# Patient Record
Sex: FEMALE | Race: WHITE | NOT HISPANIC OR LATINO | Employment: UNEMPLOYED | URBAN - METROPOLITAN AREA
[De-identification: names, ages, dates, MRNs, and addresses within clinical notes are randomized per-mention and may not be internally consistent; named-entity substitution may affect disease eponyms.]

---

## 2017-01-02 ENCOUNTER — HOSPITAL ENCOUNTER (EMERGENCY)
Facility: MEDICAL CENTER | Age: 42
End: 2017-01-02
Attending: EMERGENCY MEDICINE
Payer: MEDICAID

## 2017-01-02 VITALS
WEIGHT: 143.74 LBS | RESPIRATION RATE: 18 BRPM | HEART RATE: 69 BPM | SYSTOLIC BLOOD PRESSURE: 129 MMHG | OXYGEN SATURATION: 95 % | TEMPERATURE: 98.4 F | DIASTOLIC BLOOD PRESSURE: 79 MMHG | HEIGHT: 69 IN | BODY MASS INDEX: 21.29 KG/M2

## 2017-01-02 DIAGNOSIS — R42 DIZZINESS: ICD-10-CM

## 2017-01-02 DIAGNOSIS — T78.40XA ALLERGIC REACTION, INITIAL ENCOUNTER: ICD-10-CM

## 2017-01-02 LAB
ALBUMIN SERPL BCP-MCNC: 4.6 G/DL (ref 3.2–4.9)
ALBUMIN/GLOB SERPL: 1.8 G/DL
ALP SERPL-CCNC: 36 U/L (ref 30–99)
ALT SERPL-CCNC: 14 U/L (ref 2–50)
ANION GAP SERPL CALC-SCNC: 9 MMOL/L (ref 0–11.9)
APPEARANCE UR: CLEAR
AST SERPL-CCNC: 15 U/L (ref 12–45)
BASOPHILS # BLD AUTO: 0.7 % (ref 0–1.8)
BASOPHILS # BLD: 0.07 K/UL (ref 0–0.12)
BILIRUB SERPL-MCNC: 0.3 MG/DL (ref 0.1–1.5)
BUN SERPL-MCNC: 12 MG/DL (ref 8–22)
CALCIUM SERPL-MCNC: 9.9 MG/DL (ref 8.5–10.5)
CHLORIDE SERPL-SCNC: 102 MMOL/L (ref 96–112)
CO2 SERPL-SCNC: 23 MMOL/L (ref 20–33)
COLOR UR AUTO: YELLOW
CREAT SERPL-MCNC: 0.69 MG/DL (ref 0.5–1.4)
EKG IMPRESSION: NORMAL
EOSINOPHIL # BLD AUTO: 0.07 K/UL (ref 0–0.51)
EOSINOPHIL NFR BLD: 0.7 % (ref 0–6.9)
ERYTHROCYTE [DISTWIDTH] IN BLOOD BY AUTOMATED COUNT: 40.5 FL (ref 35.9–50)
GFR SERPL CREATININE-BSD FRML MDRD: >60 ML/MIN/1.73 M 2
GLOBULIN SER CALC-MCNC: 2.5 G/DL (ref 1.9–3.5)
GLUCOSE SERPL-MCNC: 89 MG/DL (ref 65–99)
GLUCOSE UR QL STRIP.AUTO: NEGATIVE MG/DL
HCT VFR BLD AUTO: 42.4 % (ref 37–47)
HGB BLD-MCNC: 14 G/DL (ref 12–16)
IMM GRANULOCYTES # BLD AUTO: 0.03 K/UL (ref 0–0.11)
IMM GRANULOCYTES NFR BLD AUTO: 0.3 % (ref 0–0.9)
INR PPP: 1 (ref 0.87–1.13)
KETONES UR QL STRIP.AUTO: 80 MG/DL
LEUKOCYTE ESTERASE UR QL STRIP.AUTO: NEGATIVE
LYMPHOCYTES # BLD AUTO: 3.04 K/UL (ref 1–4.8)
LYMPHOCYTES NFR BLD: 30.6 % (ref 22–41)
MCH RBC QN AUTO: 31.5 PG (ref 27–33)
MCHC RBC AUTO-ENTMCNC: 33 G/DL (ref 33.6–35)
MCV RBC AUTO: 95.3 FL (ref 81.4–97.8)
MONOCYTES # BLD AUTO: 0.76 K/UL (ref 0–0.85)
MONOCYTES NFR BLD AUTO: 7.6 % (ref 0–13.4)
NEUTROPHILS # BLD AUTO: 5.98 K/UL (ref 2–7.15)
NEUTROPHILS NFR BLD: 60.1 % (ref 44–72)
NITRITE UR QL STRIP.AUTO: NEGATIVE
NRBC # BLD AUTO: 0 K/UL
NRBC BLD AUTO-RTO: 0 /100 WBC
PH UR STRIP.AUTO: 6 [PH]
PLATELET # BLD AUTO: 297 K/UL (ref 164–446)
PMV BLD AUTO: 9 FL (ref 9–12.9)
POTASSIUM SERPL-SCNC: 3.7 MMOL/L (ref 3.6–5.5)
PROT SERPL-MCNC: 7.1 G/DL (ref 6–8.2)
PROT UR QL STRIP: NEGATIVE MG/DL
PROTHROMBIN TIME: 13.5 SEC (ref 12–14.6)
RBC # BLD AUTO: 4.45 M/UL (ref 4.2–5.4)
RBC UR QL AUTO: ABNORMAL
SODIUM SERPL-SCNC: 134 MMOL/L (ref 135–145)
SP GR UR: 1.01
T4 FREE SERPL-MCNC: 0.86 NG/DL (ref 0.53–1.43)
TSH SERPL DL<=0.005 MIU/L-ACNC: 1.98 UIU/ML (ref 0.3–3.7)
WBC # BLD AUTO: 10 K/UL (ref 4.8–10.8)

## 2017-01-02 PROCEDURE — 84443 ASSAY THYROID STIM HORMONE: CPT

## 2017-01-02 PROCEDURE — 85610 PROTHROMBIN TIME: CPT

## 2017-01-02 PROCEDURE — 93005 ELECTROCARDIOGRAM TRACING: CPT | Performed by: EMERGENCY MEDICINE

## 2017-01-02 PROCEDURE — 80053 COMPREHEN METABOLIC PANEL: CPT

## 2017-01-02 PROCEDURE — 81002 URINALYSIS NONAUTO W/O SCOPE: CPT

## 2017-01-02 PROCEDURE — 93005 ELECTROCARDIOGRAM TRACING: CPT

## 2017-01-02 PROCEDURE — 85025 COMPLETE CBC W/AUTO DIFF WBC: CPT

## 2017-01-02 PROCEDURE — 36415 COLL VENOUS BLD VENIPUNCTURE: CPT

## 2017-01-02 PROCEDURE — 99284 EMERGENCY DEPT VISIT MOD MDM: CPT

## 2017-01-02 PROCEDURE — 84439 ASSAY OF FREE THYROXINE: CPT

## 2017-01-02 ASSESSMENT — PAIN SCALES - GENERAL
PAINLEVEL_OUTOF10: 0
PAINLEVEL_OUTOF10: 0

## 2017-01-02 ASSESSMENT — LIFESTYLE VARIABLES: DO YOU DRINK ALCOHOL: NO

## 2017-01-02 NOTE — ED NOTES
Pt ambulatory to triage.   Patient took 25 mg of benadryl at 1130. Is still experiencing symptoms of allergic reaction.  Pt states she is allergic to sulfides, she ate an omelet with citric acid at 930 this morning.    Cough, itchy throat, tingly sensation in tongue cheeks and ears, diarrhea x 2. When she stands up she feels tightness in her chest

## 2017-01-02 NOTE — ED AVS SNAPSHOT
Tink Access Code: [email or web address]  Expires: 2/1/2017  7:19 PM    Your email address is not on file at [facility].  Email Addresses are required for you to sign up for Tink, please contact [phone] to verify your personal information and to provide your email address prior to attempting to register for Tink.    Jacy Fultonamparo  [address] [place] [address]    Tink  A secure, online tool to manage your health information     [facility]’s Tink® is a secure, online tool that connects you to your personalized health information from the privacy of your home -- day or night - making it very easy for you to manage your healthcare. Once the activation process is completed, you can even access your medical information using the Tink radha, which is available for free in the Apple Radha store or Google Play store.     To learn more about Tink, visit [email or web address]    There are two levels of access available (as shown below):   My Chart Features  [facility] Primary Care Doctor [facility]  Specialists [facility]  Urgent  Care Non-[facility] Primary Care Doctor   Email your healthcare team securely and privately 24/7 X X X    Manage appointments: schedule your next appointment; view details of past/upcoming appointments X      Request prescription refills. X      View recent personal medical records, including lab and immunizations X X X X   View health record, including health history, allergies, medications X X X X   Read reports about your outpatient visits, procedures, consult and ER notes X X X X   See your discharge summary, which is a recap of your hospital and/or ER visit that includes your diagnosis, lab results, and care plan X X  X     How to register for N42t:  Once your e-mail address has been verified, follow the following steps to sign up for Tink.     1. Go to  [email or web address]  2. Click on the Sign Up Now box, which takes you to the New Member Sign Up page. You  will need to provide the following information:  a. Enter your [employer] Access Code exactly as it appears at the top of this page. (You will not need to use this code after you’ve completed the sign-up process. If you do not sign up before the expiration date, you must request a new code.)   b. Enter your date of birth.   c. Enter your home email address.   d. Click Submit, and follow the next screen’s instructions.  3. Create a [employer] ID. This will be your ROR Media login ID and cannot be changed, so think of one that is secure and easy to remember.  4. Create a [employer] password. You can change your password at any time.  5. Enter your Password Reset Question and Answer. This can be used at a later time if you forget your password.   6. Enter your e-mail address. This allows you to receive e-mail notifications when new information is available in ROR Media.  7. Click Sign Up. You can now view your health information.    For assistance activating your [employer] account, call [phone]

## 2017-01-02 NOTE — ED NOTES
Patient vital signs stable patient returned to lobby , encouraged to return to desk if symptoms worsen    Has HX of sinus tachycardia

## 2017-01-02 NOTE — ED AVS SNAPSHOT
1/2/2017          Jacy Hoang  [address] [place] [address]    Dear Jacy:    [facility] wants to ensure your discharge home is safe and you or your loved ones have had all your questions answered regarding your care after you leave the hospital.    You may receive a telephone call within two days of your discharge.  This call is to make certain you understand your discharge instructions as well as ensure we provided you with the best care possible during your stay with us.     The call will only last approximately 3-5 minutes and will be done by a nurse.    Once again, we want to ensure your discharge home is safe and that you have a clear understanding of any next steps in your care.  If you have any questions or concerns, please do not hesitate to contact us, we are here for you.  Thank you for choosing [facility] for your healthcare needs.    Sincerely,    Kedar Almazan    [facility]

## 2017-01-02 NOTE — ED AVS SNAPSHOT
After Visit Summary                                                                                                                Jacy Hoang   MRN: [id number]    Department:  [facility], Emergency Dept   Date of Visit:  1/2/2017            [facility], Emergency Dept    [address]    [place] [address]    Phone:  [phone]      You were seen by     Gary Gansert, M.D.      Your Diagnosis Was     Dizziness     R42       Medication Information     Review all of your home medications and newly ordered medications with your primary doctor and/or pharmacist as soon as possible. Follow medication instructions as directed by your doctor and/or pharmacist.     Please keep your complete medication list with you and share with your physician. Update the information when medications are discontinued, doses are changed, or new medications (including over-the-counter products) are added; and carry medication information at all times in the event of emergency situations.               Medication List      ASK your doctor about these medications        Instructions    fish oil 1000 MG Caps capsule    Take 1,000 mg by mouth every day.   Dose:  1000 mg       lorazepam 0.5 MG Tabs   Commonly known as:  ATIVAN    Take 1 Tab by mouth every 8 hours as needed for Anxiety.   Dose:  0.5 mg       Potassium Gluconate 595 MG Tabs    Take 5 Tabs by mouth 2 Times a Day.   Dose:  5 Tab       Vitamin B Complex Tabs    Take 1 Tab by mouth every day.   Dose:  1 Tab       Vitamin D-3 1000 UNITS Caps    Take 1,000 Units by mouth every day.   Dose:  1000 Units       WELLBUTRIN  MG Tb12 sustained-release tablet   Generic drug:  buPROPion SR    Take 150 mg by mouth 2 times a day.   Dose:  150 mg               Procedures and tests performed during your visit     CBC WITH DIFFERENTIAL    COMP METABOLIC PANEL    EKG (ER)    EKG (NOW)    ESTIMATED GFR    FREE THYROXINE    NURSING COMMUNICATION       OLD EKG    POC UA    PROTHROMBIN TIME    TSH        Discharge Instructions       Allergies  An allergy is an abnormal reaction to a substance by the body's defense system (immune system). Allergies can develop at any age.  WHAT CAUSES ALLERGIES?  An allergic reaction happens when the immune system mistakenly reacts to a normally harmless substance, called an allergen, as if it were harmful. The immune system releases antibodies to fight the substance. Antibodies eventually release a chemical called histamine into the bloodstream. The release of histamine is meant to protect the body from infection, but it also causes discomfort.  An allergic reaction can be triggered by:  · Eating an allergen.  · Inhaling an allergen.  · Touching an allergen.  WHAT TYPES OF ALLERGIES ARE THERE?  There are many types of allergies. Common types include:  · Seasonal allergies. People with this type of allergy are usually allergic to substances that are only present during certain seasons, such as molds and pollens.  · Food allergies.  · Drug allergies.  · Insect allergies.  · Animal dander allergies.  WHAT ARE SYMPTOMS OF ALLERGIES?  Possible allergy symptoms include:  · Swelling of the lips, face, tongue, mouth, or throat.  · Sneezing, coughing, or wheezing.  · Nasal congestion.  · Tingling in the mouth.  · Rash.  · Itching.  · Itchy, red, swollen areas of skin (hives).  · Watery eyes.  · Vomiting.  · Diarrhea.  · Dizziness.  · Lightheadedness.  · Fainting.  · Trouble breathing or swallowing.  · Chest tightness.  · Rapid heartbeat.  HOW ARE ALLERGIES DIAGNOSED?  Allergies are diagnosed with a medical and family history and one or more of the following:  · Skin tests.  · Blood tests.  · A food diary. A food diary is a record of all the foods and drinks you have in a day and of all the symptoms you experience.  · The results of an elimination diet. An elimination diet involves eliminating foods from your diet and then adding them  back in one by one to find out if a certain food causes an allergic reaction.  HOW ARE ALLERGIES TREATED?  There is no cure for allergies, but allergic reactions can be treated with medicine. Severe reactions usually need to be treated at a hospital.  HOW CAN REACTIONS BE PREVENTED?  The best way to prevent an allergic reaction is by avoiding the substance you are allergic to. Allergy shots and medicines can also help prevent reactions in some cases. People with severe allergic reactions may be able to prevent a life-threatening reaction called anaphylaxis with a medicine given right after exposure to the allergen.     This information is not intended to replace advice given to you by your health care provider. Make sure you discuss any questions you have with your health care provider.     Document Released: 03/12/2004 Document Revised: 01/08/2016 Document Reviewed: 09/29/2015  [email or web address] Interactive Patient Education ©2016 [email or web address] Inc.    Dizziness  Dizziness is a common problem. It makes you feel unsteady or lightheaded. You may feel like you are about to pass out (faint). Dizziness can lead to injury if you stumble or fall. Anyone can get dizzy, but dizziness is more common in older adults. This condition can be caused by a number of things, including:  · Medicines.  · Dehydration.  · Illness.  HOME CARE  Following these instructions may help with your condition:  Eating and Drinking  · Drink enough fluid to keep your pee (urine) clear or pale yellow. This helps to keep you from getting dehydrated. Try to drink more clear fluids, such as water.  · Do not drink alcohol.  · Limit how much caffeine you drink or eat if told by your doctor.  · Limit how much salt you drink or eat if told by your doctor.  Activity  · Avoid making quick movements.  ¨ When you stand up from sitting in a chair, steady yourself until you feel okay.  ¨ In the morning, first sit up on the side of the bed. When you feel okay, stand slowly  while you hold onto something. Do this until you know that your balance is fine.  · Move your legs often if you need to  one place for a long time. Tighten and relax your muscles in your legs while you are standing.  · Do not drive or use heavy machinery if you feel dizzy.  · Avoid bending down if you feel dizzy. Place items in your home so that they are easy for you to reach without leaning over.  Lifestyle  · Do not use any tobacco products, including cigarettes, chewing tobacco, or electronic cigarettes. If you need help quitting, ask your doctor.  · Try to lower your stress level, such as with yoga or meditation. Talk with your doctor if you need help.  General Instructions  · Watch your dizziness for any changes.  · Take medicines only as told by your doctor. Talk with your doctor if you think that your dizziness is caused by a medicine that you are taking.  · Tell a friend or a family member that you are feeling dizzy. If he or she notices any changes in your behavior, have this person call your doctor.  · Keep all follow-up visits as told by your doctor. This is important.  GET HELP IF:  · Your dizziness does not go away.  · Your dizziness or light-headedness gets worse.  · You feel sick to your stomach (nauseous).  · You have trouble hearing.  · You have new symptoms.  · You are unsteady on your feet or you feel like the room is spinning.  GET HELP RIGHT AWAY IF:  · You throw up (vomit) or have diarrhea and are unable to eat or drink anything.  · You have trouble:  ¨ Talking.  ¨ Walking.  ¨ Swallowing.  ¨ Using your arms, hands, or legs.  · You feel generally weak.  · You are not thinking clearly or you have trouble forming sentences. It may take a friend or family member to notice this.  · You have:  ¨ Chest pain.  ¨ Pain in your belly (abdomen).  ¨ Shortness of breath.  ¨ Sweating.  · Your vision changes.  · You are bleeding.  · You have a headache.  · You have neck pain or a stiff neck.  · You  have a fever.     This information is not intended to replace advice given to you by your health care provider. Make sure you discuss any questions you have with your health care provider.     Document Released: 12/06/2012 Document Revised: 05/03/2016 Document Reviewed: 12/14/2015  Elsevier Interactive Patient Education ©2016 [employer]            Patient Information     Patient Information    Following emergency treatment: all patient requiring follow-up care must return either to a private physician or a clinic if your condition worsens before you are able to obtain further medical attention, please return to the emergency room.     Billing Information    At [facility], we work to make the billing process streamlined for our patients.  Our Representatives are here to answer any questions you may have regarding your hospital bill.  If you have insurance coverage and have supplied your insurance information to us, we will submit a claim to your insurer on your behalf.  Should you have any questions regarding your bill, we can be reached online or by phone as follows:  Online: You are able pay your bills online or live chat with our representatives about any billing questions you may have. We are here to help Monday - Friday from 8:00am to 7:30pm and 9:00am - 12:00pm on Saturdays.  Please visit [email or web address]  for more information.   Phone:  [phone] or [phone]    Please note that your emergency physician, surgeon, pathologist, radiologist, anesthesiologist, and other specialists are not employed by [facility] and will therefore bill separately for their services.  Please contact them directly for any questions concerning their bills at the numbers below:     Emergency Physician Services:  [phone]  [facility]:  [phone]  [facility]:  [phone]  [facility]:  [phone]    1. Your final bill may vary  from the amount quoted upon discharge if all procedures are not complete at that time, or if your doctor has additional procedures of which we are not aware. You will receive an additional bill if you return to the Emergency Department at Counts include 234 beds at the [facility] for suture removal regardless of the facility of which the sutures were placed.     2. Please arrange for settlement of this account at the emergency registration.    3. All self-pay accounts are due in full at the time of treatment.  If you are unable to meet this obligation then payment is expected within 4-5 days.     4. If you have had radiology studies (CT, X-ray, Ultrasound, MRI), you have received a preliminary result during your emergency department visit. Please contact the radiology department [phone] to receive a copy of your final result. Please discuss the Final result with your primary physician or with the follow up physician provided.     Crisis Hotline:  [place] Crisis Hotline:  4-835-EHFHZAA or 1-819.564.5645  [place] Crisis Hotline:    1-153.726.5254 or 992-057-6352         ED Discharge Follow Up Questions    1. In order to provide you with very good care, we would like to follow up with a phone call in the next few days.  May we have your permission to contact you?     YES /  NO    2. What is the best phone number to call you? (       )_____-__________    3. What is the best time to call you?      Morning  /  Afternoon  /  Evening                   Patient Signature:  ____________________________________________________________    Date:  ____________________________________________________________

## 2017-01-03 NOTE — DISCHARGE INSTRUCTIONS
Allergies  An allergy is an abnormal reaction to a substance by the body's defense system (immune system). Allergies can develop at any age.  WHAT CAUSES ALLERGIES?  An allergic reaction happens when the immune system mistakenly reacts to a normally harmless substance, called an allergen, as if it were harmful. The immune system releases antibodies to fight the substance. Antibodies eventually release a chemical called histamine into the bloodstream. The release of histamine is meant to protect the body from infection, but it also causes discomfort.  An allergic reaction can be triggered by:  · Eating an allergen.  · Inhaling an allergen.  · Touching an allergen.  WHAT TYPES OF ALLERGIES ARE THERE?  There are many types of allergies. Common types include:  · Seasonal allergies. People with this type of allergy are usually allergic to substances that are only present during certain seasons, such as molds and pollens.  · Food allergies.  · Drug allergies.  · Insect allergies.  · Animal dander allergies.  WHAT ARE SYMPTOMS OF ALLERGIES?  Possible allergy symptoms include:  · Swelling of the lips, face, tongue, mouth, or throat.  · Sneezing, coughing, or wheezing.  · Nasal congestion.  · Tingling in the mouth.  · Rash.  · Itching.  · Itchy, red, swollen areas of skin (hives).  · Watery eyes.  · Vomiting.  · Diarrhea.  · Dizziness.  · Lightheadedness.  · Fainting.  · Trouble breathing or swallowing.  · Chest tightness.  · Rapid heartbeat.  HOW ARE ALLERGIES DIAGNOSED?  Allergies are diagnosed with a medical and family history and one or more of the following:  · Skin tests.  · Blood tests.  · A food diary. A food diary is a record of all the foods and drinks you have in a day and of all the symptoms you experience.  · The results of an elimination diet. An elimination diet involves eliminating foods from your diet and then adding them back in one by one to find out if a certain food causes an allergic reaction.  HOW ARE  ALLERGIES TREATED?  There is no cure for allergies, but allergic reactions can be treated with medicine. Severe reactions usually need to be treated at a hospital.  HOW CAN REACTIONS BE PREVENTED?  The best way to prevent an allergic reaction is by avoiding the substance you are allergic to. Allergy shots and medicines can also help prevent reactions in some cases. People with severe allergic reactions may be able to prevent a life-threatening reaction called anaphylaxis with a medicine given right after exposure to the allergen.     This information is not intended to replace advice given to you by your health care provider. Make sure you discuss any questions you have with your health care provider.     Document Released: 03/12/2004 Document Revised: 01/08/2016 Document Reviewed: 09/29/2015  [employer] Interactive Patient Education ©2016 Elsevier Inc.    Dizziness  Dizziness is a common problem. It makes you feel unsteady or lightheaded. You may feel like you are about to pass out (faint). Dizziness can lead to injury if you stumble or fall. Anyone can get dizzy, but dizziness is more common in older adults. This condition can be caused by a number of things, including:  · Medicines.  · Dehydration.  · Illness.  HOME CARE  Following these instructions may help with your condition:  Eating and Drinking  · Drink enough fluid to keep your pee (urine) clear or pale yellow. This helps to keep you from getting dehydrated. Try to drink more clear fluids, such as water.  · Do not drink alcohol.  · Limit how much caffeine you drink or eat if told by your doctor.  · Limit how much salt you drink or eat if told by your doctor.  Activity  · Avoid making quick movements.  ¨ When you stand up from sitting in a chair, steady yourself until you feel okay.  ¨ In the morning, first sit up on the side of the bed. When you feel okay, stand slowly while you hold onto something. Do this until you know that your balance is fine.  · Move  your legs often if you need to  one place for a long time. Tighten and relax your muscles in your legs while you are standing.  · Do not drive or use heavy machinery if you feel dizzy.  · Avoid bending down if you feel dizzy. Place items in your home so that they are easy for you to reach without leaning over.  Lifestyle  · Do not use any tobacco products, including cigarettes, chewing tobacco, or electronic cigarettes. If you need help quitting, ask your doctor.  · Try to lower your stress level, such as with yoga or meditation. Talk with your doctor if you need help.  General Instructions  · Watch your dizziness for any changes.  · Take medicines only as told by your doctor. Talk with your doctor if you think that your dizziness is caused by a medicine that you are taking.  · Tell a friend or a family member that you are feeling dizzy. If he or she notices any changes in your behavior, have this person call your doctor.  · Keep all follow-up visits as told by your doctor. This is important.  GET HELP IF:  · Your dizziness does not go away.  · Your dizziness or light-headedness gets worse.  · You feel sick to your stomach (nauseous).  · You have trouble hearing.  · You have new symptoms.  · You are unsteady on your feet or you feel like the room is spinning.  GET HELP RIGHT AWAY IF:  · You throw up (vomit) or have diarrhea and are unable to eat or drink anything.  · You have trouble:  ¨ Talking.  ¨ Walking.  ¨ Swallowing.  ¨ Using your arms, hands, or legs.  · You feel generally weak.  · You are not thinking clearly or you have trouble forming sentences. It may take a friend or family member to notice this.  · You have:  ¨ Chest pain.  ¨ Pain in your belly (abdomen).  ¨ Shortness of breath.  ¨ Sweating.  · Your vision changes.  · You are bleeding.  · You have a headache.  · You have neck pain or a stiff neck.  · You have a fever.     This information is not intended to replace advice given to you by your  health care provider. Make sure you discuss any questions you have with your health care provider.     Document Released: 12/06/2012 Document Revised: 05/03/2016 Document Reviewed: 12/14/2015  [employer] Interactive Patient Education ©2016 Elsevier Inc.

## 2017-01-03 NOTE — ED NOTES
PV removed. Patient reports readiness for discharge. Discharge instructions and follow-up care reviewed with patient. All questions answered and patient verbalized understanding. Vss. Patient stable and ambulatory upon d/c from ED.

## 2017-01-03 NOTE — ED PROVIDER NOTES
ED Provider Note  CHIEF COMPLAINT  Lightheadedness    HPI  Jacy Hoang is a 41 y.o. female who presents aren't evaluation of an episode of lightheadedness today. She felt a little bit lightheaded yesterday after she took some cough medicine. She thinks it's related to the sulfites in the medication. She had no itching or hives nor facial swelling or difficulty breathing. She went to breakfast this morning and she had some she had an omelette that was made with elena Hester and supposedly alleviate Mir has either sulfites or citric acid and it and so she thought she was having an allergic reaction. She feels fine now. She is low but tachycardic. She said she is always tachycardic.    REVIEW OF SYSTEMS  No headache, stiffening, no chest pain, no difficulty breathing, no abdominal pain.  ALL OTHER SYSTEMS NEGATIVE    ALLERGIES  Allergies   Allergen Reactions   • Amoxicillin      Rash     • Citric Acid Swelling and Cough   • Crab    • Scallops    • Sulfites        CURRENT MEDICATIONS  Home Medications     Reviewed by Marjorie Hamilton R.N. (Registered Nurse) on 01/02/17 at 1637  Med List Status: Partial    Medication Last Dose Status    B Complex Vitamins (VITAMIN B COMPLEX) TABS 5/28/2015 Active    buPROPion SR (WELLBUTRIN SR) 150 MG TB12 sustained-release tablet 5/28/2015 Active    Cholecalciferol (VITAMIN D-3) 1000 UNITS CAPS 5/28/2015 Active    lorazepam (ATIVAN) 0.5 MG TABS  Active    Omega-3 Fatty Acids (FISH OIL) 1000 MG CAPS capsule 5/28/2015 Active    Potassium Gluconate 595 MG TABS 5/28/2015 Active                PAST MEDICAL HISTORY  Past Medical History   Diagnosis Date   • Heart murmur    • History of sinus tachycardia    • Chronic fatigue    • Hypokalemia      FAMILY HISTORY  Family History   Problem Relation Age of Onset   • Heart Disease Father    • Stroke Father    • Lung Disease Father    • Cancer Neg Hx    • Diabetes Neg Hx        SOCIAL HISTORY      PHYSICAL EXAM  GENERAL: Alert healthy  "appearing female  VITAL SIGNS: /88 mmHg  Pulse 90  Temp(Src) 36.9 °C (98.4 °F) (Temporal)  Resp 36  Ht 1.753 m (5' 9\")  Wt 65.2 kg (143 lb 11.8 oz)  BMI 21.22 kg/m2  SpO2 98%  LMP 12/08/2016  Constitutional: Alert healthy-appearing adult   HENT: Scalp is normal size and nontender. Ears are clear. Nose is clear. Throat is clear with no stridor no drooling no trismus. Teeth are all intact.  Eyes: Pupils equal round and reactive to light, extraocular motor fall. There is no scleral icterus.  Neck: Neck is supple and nontender. There is no meningismus. No adenitis. No thyromegaly.  Lymphatic: No adenopathy.   Cardiovascular: Heart regular rhythm without murmurs or gallops   Thorax & Lungs: No chest wall tenderness. Lungs are clear. Patient has good breath sounds bilateral. No rales, no rhonchi, no wheezes.  Abdomen: Abdomen is soft, nontender, not rigid, no guarding, and no organomegaly. There is no palpable hernia   Skin: Warm, pink, and dry with no erythema and no rash.   Back: Nontender, no midline bony tenderness, no flank tenderness.                                               Extremities: Full range of motion  No tenderness to palpation and no deformities noted. No calf or thigh swelling. No calf or thigh tenderness. No clinical DVT.  Neurologic: Alert & oriented . Cranial nerves are grossly intact as tested. Patient moves all 4 extremities well. Patient has good strong flexion and extension of the ankle joints knee joints hip joints and elbow joints. Sensation is normal and symmetrical in the upper and lower extremities.   Psychiatric: Patient is alert oriented coherent and rational.     EKG  EKG Interpretation    Interpreted by me    Rhythm: normal sinus   Rate: normal  Axis: normal  Ectopy: none  Conduction: normal  ST Segments: no acute change  T Waves: no acute change  Q Waves: none    Clinical Impression: no acute changes and normal EKG      COURSE & MEDICAL DECISION MAKING  Patient really " has no signs of an acute allergic reaction at this time. No hives. No tongue swelling. No facial swelling. No wheezing. No rash. No itching. She said she felt just a little lightheaded. She feels fine now. No vertigo. No headache. No chest pain.    BHARATI: #1 IV #2 EKG #3 observation the ED number for lab including CBC, CMP, troponin, ProTime, T4, TSH.    Laboratory and reexamination: Urine shows positive blood and negative leukocyte esterase. Thyroid studies are normal. M history panel normal. INR normal. CBC is normal. EKG shows a normal sinus rhythm no specific ST-T wave change.  Results for orders placed or performed during the hospital encounter of 01/02/17   CBC WITH DIFFERENTIAL   Result Value Ref Range    WBC 10.0 4.8 - 10.8 K/uL    RBC 4.45 4.20 - 5.40 M/uL    Hemoglobin 14.0 12.0 - 16.0 g/dL    Hematocrit 42.4 37.0 - 47.0 %    MCV 95.3 81.4 - 97.8 fL    MCH 31.5 27.0 - 33.0 pg    MCHC 33.0 (L) 33.6 - 35.0 g/dL    RDW 40.5 35.9 - 50.0 fL    Platelet Count 297 164 - 446 K/uL    MPV 9.0 9.0 - 12.9 fL    Neutrophils-Polys 60.10 44.00 - 72.00 %    Lymphocytes 30.60 22.00 - 41.00 %    Monocytes 7.60 0.00 - 13.40 %    Eosinophils 0.70 0.00 - 6.90 %    Basophils 0.70 0.00 - 1.80 %    Immature Granulocytes 0.30 0.00 - 0.90 %    Nucleated RBC 0.00 /100 WBC    Neutrophils (Absolute) 5.98 2.00 - 7.15 K/uL    Lymphs (Absolute) 3.04 1.00 - 4.80 K/uL    Monos (Absolute) 0.76 0.00 - 0.85 K/uL    Eos (Absolute) 0.07 0.00 - 0.51 K/uL    Baso (Absolute) 0.07 0.00 - 0.12 K/uL    Immature Granulocytes (abs) 0.03 0.00 - 0.11 K/uL    NRBC (Absolute) 0.00 K/uL   COMP METABOLIC PANEL   Result Value Ref Range    Sodium 134 (L) 135 - 145 mmol/L    Potassium 3.7 3.6 - 5.5 mmol/L    Chloride 102 96 - 112 mmol/L    Co2 23 20 - 33 mmol/L    Anion Gap 9.0 0.0 - 11.9    Glucose 89 65 - 99 mg/dL    Bun 12 8 - 22 mg/dL    Creatinine 0.69 0.50 - 1.40 mg/dL    Calcium 9.9 8.5 - 10.5 mg/dL    AST(SGOT) 15 12 - 45 U/L    ALT(SGPT) 14 2 - 50 U/L     Alkaline Phosphatase 36 30 - 99 U/L    Total Bilirubin 0.3 0.1 - 1.5 mg/dL    Albumin 4.6 3.2 - 4.9 g/dL    Total Protein 7.1 6.0 - 8.2 g/dL    Globulin 2.5 1.9 - 3.5 g/dL    A-G Ratio 1.8 g/dL   PROTHROMBIN TIME   Result Value Ref Range    PT 13.5 12.0 - 14.6 sec    INR 1.00 0.87 - 1.13   TSH   Result Value Ref Range    TSH 1.980 0.300 - 3.700 uIU/mL   FREE THYROXINE   Result Value Ref Range    Free T-4 0.86 0.53 - 1.43 ng/dL   ESTIMATED GFR   Result Value Ref Range    GFR If African American >60 >60 mL/min/1.73 m 2    GFR If Non African American >60 >60 mL/min/1.73 m 2   POC UA   Result Value Ref Range    POC Color Yellow     POC Appearance Clear     POC Glucose Negative Negative mg/dL    POC Ketones 80 (A) Negative mg/dL    POC Specific Gravity 1.010 1.005-1.030    POC Blood Large (A) Negative    POC Urine PH 6.0 5.0-8.0    POC Protein Negative Negative mg/dL    POC Nitrites Negative Negative    POC Leukocyte Esterase Negative Negative   EKG (NOW)   Result Value Ref Range    Report       [facility]    Test Date:  2017  Pt Name:    MAREN RUIZ              Department: ER  MRN:        [id number]                      Room:  Gender:     F                            Technician: RONA  :        [id number]                   Requested By:ER TRIAGE PROTOCOL  Order #:    [id number]                    Reading MD:    Measurements  Intervals                                Axis  Rate:       95                           P:          78  KY:         136                          QRS:        101  QRSD:       92                           T:          22  QT:         336  QTc:        423    Interpretive Statements  SINUS RHYTHM  BIATRIAL ABNORMALITIES  RIGHT AXIS DEVIATION  ABNRM R PROG, CONSIDER ASMI OR LEAD PLACEMENT  BORDERLINE T ABNORMALITIES, ANTERIOR LEADS  Compared to ECG 2015 00:35:57  Right-axis deviation now present  Myocardial infarct finding now present  T-wave abnormality  still present          The patient been observed in the ED from 2 PM until after 7 PM. She's been asymptomatic. Normal vital signs. Stable for discharge.    Home treatment: #1 she been given a copy of all of her reports #2 she will follow-up with her family physician this week in and see an allergist this week. #3 she'll keep a food diary #4 she'll avoid sulfites and citric acid in the near future. Return as needed.  FINAL IMPRESSION  1. Lightheadedness  2. Possible allergic reaction to sulfites and/or citric acid.         Electronically signed by: Gary Gansert, 1/2/2017 7 PM

## 2020-10-09 ENCOUNTER — HOSPITAL ENCOUNTER (OUTPATIENT)
Dept: RADIOLOGY | Facility: MEDICAL CENTER | Age: 45
End: 2020-10-09
Attending: STUDENT IN AN ORGANIZED HEALTH CARE EDUCATION/TRAINING PROGRAM
Payer: COMMERCIAL

## 2020-10-09 DIAGNOSIS — R07.9 CHEST PAIN, UNSPECIFIED TYPE: ICD-10-CM

## 2020-10-09 PROCEDURE — 71275 CT ANGIOGRAPHY CHEST: CPT

## 2020-10-09 PROCEDURE — 700117 HCHG RX CONTRAST REV CODE 255: Performed by: STUDENT IN AN ORGANIZED HEALTH CARE EDUCATION/TRAINING PROGRAM

## 2020-10-09 RX ADMIN — IOHEXOL 100 ML: 350 INJECTION, SOLUTION INTRAVENOUS at 16:00

## 2020-10-15 ENCOUNTER — TELEPHONE (OUTPATIENT)
Dept: CARDIOLOGY | Facility: MEDICAL CENTER | Age: 45
End: 2020-10-15

## 2020-10-15 NOTE — TELEPHONE ENCOUNTER
Called patient to see if he has followed with a cardiologist in the past to get records prior to new patient appt with [facility]. Denies seeing a cardiologist in the last 10 years, however has been seen at [facility] urgent care in the past.     Faxed STAT records request to [facility] (F: [phone], P: [phone]). Fax confirmation received.      All other testing has been completed at [facility].

## 2020-10-21 ENCOUNTER — OFFICE VISIT (OUTPATIENT)
Dept: CARDIOLOGY | Facility: MEDICAL CENTER | Age: 45
End: 2020-10-21
Payer: COMMERCIAL

## 2020-10-21 VITALS
OXYGEN SATURATION: 96 % | HEIGHT: 68 IN | RESPIRATION RATE: 16 BRPM | SYSTOLIC BLOOD PRESSURE: 108 MMHG | BODY MASS INDEX: 22.72 KG/M2 | WEIGHT: 149.91 LBS | DIASTOLIC BLOOD PRESSURE: 82 MMHG | HEART RATE: 107 BPM

## 2020-10-21 DIAGNOSIS — R00.2 PALPITATIONS: ICD-10-CM

## 2020-10-21 DIAGNOSIS — Z01.818 PREOPERATIVE EXAMINATION: ICD-10-CM

## 2020-10-21 DIAGNOSIS — Q79.60 EHLERS-DANLOS DISEASE: ICD-10-CM

## 2020-10-21 LAB — EKG IMPRESSION: NORMAL

## 2020-10-21 PROCEDURE — 93000 ELECTROCARDIOGRAM COMPLETE: CPT | Performed by: INTERNAL MEDICINE

## 2020-10-21 PROCEDURE — 99205 OFFICE O/P NEW HI 60 MIN: CPT | Performed by: INTERNAL MEDICINE

## 2020-10-21 RX ORDER — MAGNESIUM 200 MG
TABLET ORAL
COMMUNITY

## 2020-10-21 RX ORDER — THEANINE 100 MG
CAPSULE ORAL
COMMUNITY

## 2020-10-21 ASSESSMENT — ENCOUNTER SYMPTOMS
NEAR-SYNCOPE: 0
DEPRESSION: 0
PND: 0
PALPITATIONS: 1
WEIGHT GAIN: 0
ABDOMINAL PAIN: 0
VOMITING: 0
DYSPNEA ON EXERTION: 0
SYNCOPE: 0
DECREASED APPETITE: 0
BACK PAIN: 0
WEIGHT LOSS: 0
HEARTBURN: 0
ORTHOPNEA: 0
DIARRHEA: 0
ALTERED MENTAL STATUS: 0
FLANK PAIN: 0
NAUSEA: 0
FEVER: 0
BLURRED VISION: 0
COUGH: 0
CONSTIPATION: 0
SHORTNESS OF BREATH: 0
CLAUDICATION: 0
DIZZINESS: 0
IRREGULAR HEARTBEAT: 0

## 2020-10-21 NOTE — PROGRESS NOTES
"Cardiology Note    Chief Complaint   Patient presents with   • Tachycardia       History of Present Illness: Jacy Hoang is a 44 y.o. female who presents for \"tachy-frank\" sent by GI consultants who plan endoscopy.    Prior admit in 2015 for tachycardia and chest pain where on workup found normal troponin x3, normal variant EKG, normal echo, normal telemetry, normal ECG stress test.     Patient recounts that tachycardia found years ago with prior cardiologist. Has heart rate monitor on watch and finds heart rate can sometimes drop to 40s (more often in the 50s, she admits) and without much exertion will jump into the 100s. Describes she feels fatigued and \"exhausted.\" Has only had syncope once when she had influenza infection. She does get light headed. 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     Review of Systems   Constitution: Negative for decreased appetite, fever, malaise/fatigue, weight gain and weight loss.   HENT: Negative for congestion and nosebleeds.    Eyes: Negative for blurred vision.   Cardiovascular: Positive for palpitations. Negative for chest pain, claudication, dyspnea on exertion, irregular heartbeat, leg swelling, near-syncope, orthopnea, paroxysmal nocturnal dyspnea and syncope.   Respiratory: Negative for cough and shortness of breath.    Endocrine: Negative for cold intolerance and heat intolerance.   Skin: Negative for rash.   Musculoskeletal: Negative for back pain.   Gastrointestinal: Negative for abdominal pain, constipation, diarrhea, heartburn, melena, nausea and vomiting.   Genitourinary: Negative for dysuria, flank pain and hematuria.   Neurological: Negative for dizziness.   Psychiatric/Behavioral: Negative for altered mental status and depression.         Past Medical History:   Diagnosis Date   • Chronic fatigue    • Heart murmur    • History of sinus tachycardia    • Hypokalemia          History reviewed. No " pertinent surgical history.      Current Outpatient Medications   Medication Sig Dispense Refill   • Acetylcysteine (N-ACETYL-L-CYSTEINE) 600 MG Cap Take  by mouth.     • Magnesium 200 MG Tab Take  by mouth.     • Aloe Liquid Take  by mouth.     • D-RIBOSE PO Take  by mouth.     • Multiple Vitamins-Minerals (ZINC PO) Take  by mouth. With Elderberry     • L-Theanine 100 MG Cap Take  by mouth.     • Doxylamine Succinate, Sleep, (SLEEP AID PO) Take  by mouth.     • VALERIAN ROOT PO Take  by mouth.     • Hyaluronic Acid-Vitamin C (HYALURONIC ACID PO) Take  by mouth.     • Cholecalciferol (VITAMIN D-3) 1000 UNITS CAPS Take 1,000 Units by mouth every day.     • Potassium Gluconate 595 MG TABS Take 5 Tabs by mouth 2 Times a Day.     • lorazepam (ATIVAN) 0.5 MG TABS Take 1 Tab by mouth every 8 hours as needed for Anxiety. 15 Tab 0   • B Complex Vitamins (VITAMIN B COMPLEX) TABS Take 1 Tab by mouth every day.     • buPROPion SR (WELLBUTRIN SR) 150 MG TB12 sustained-release tablet Take 150 mg by mouth 2 times a day.     • Omega-3 Fatty Acids (FISH OIL) 1000 MG CAPS capsule Take 1,000 mg by mouth every day.       No current facility-administered medications for this visit.          Allergies   Allergen Reactions   • Amoxicillin      Rash     • Citric Acid Swelling and Cough   • Crab    • Scallops    • Sulfites          Family History   Problem Relation Age of Onset   • Heart Disease Father    • Stroke Father    • Lung Disease Father    • Cancer Neg Hx    • Diabetes Neg Hx          Social History     Socioeconomic History   • Marital status:      Spouse name: Not on file   • Number of children: Not on file   • Years of education: Not on file   • Highest education level: Not on file   Occupational History   • Not on file   Social Needs   • Financial resource strain: Not on file   • Food insecurity     Worry: Not on file     Inability: Not on file   • Transportation needs     Medical: Not on file     Non-medical: Not on  "file   Tobacco Use   • Smoking status: Never Smoker   • Smokeless tobacco: Never Used   Substance and Sexual Activity   • Alcohol use: No     Comment: occassional   • Drug use: No   • Sexual activity: Not on file   Lifestyle   • Physical activity     Days per week: Not on file     Minutes per session: Not on file   • Stress: Not on file   Relationships   • Social connections     Talks on phone: Not on file     Gets together: Not on file     Attends Scientologist service: Not on file     Active member of club or organization: Not on file     Attends meetings of clubs or organizations: Not on file     Relationship status: Not on file   • Intimate partner violence     Fear of current or ex partner: Not on file     Emotionally abused: Not on file     Physically abused: Not on file     Forced sexual activity: Not on file   Other Topics Concern   • Not on file   Social History Narrative   • Not on file         Physical Exam:  Ambulatory Vitals  /82 (BP Location: Left arm, Patient Position: Sitting, BP Cuff Size: Adult)   Pulse (!) 107   Resp 16   Ht 1.727 m (5' 8\")   Wt 68 kg (149 lb 14.6 oz)   SpO2 96%    BP Readings from Last 4 Encounters:   10/21/20 108/82   01/02/17 129/79   05/29/15 115/78   11/14/14 112/70     Weight/BMI:   Vitals:    10/21/20 1022   BP: 108/82   Weight: 68 kg (149 lb 14.6 oz)   Height: 1.727 m (5' 8\")    Body mass index is 22.79 kg/m².  Wt Readings from Last 4 Encounters:   10/21/20 68 kg (149 lb 14.6 oz)   01/02/17 65.2 kg (143 lb 11.8 oz)   05/28/15 62.1 kg (137 lb)   11/13/14 63.5 kg (140 lb)       Physical Exam   Constitutional: She is oriented to person, place, and time and well-developed, well-nourished, and in no distress. No distress.   HENT:   Head: Normocephalic and atraumatic.   Eyes: Pupils are equal, round, and reactive to light. Conjunctivae are normal.   Neck: Normal range of motion. Neck supple. No JVD present.   Cardiovascular: Normal rate, regular rhythm, normal heart " sounds and intact distal pulses. Exam reveals no gallop and no friction rub.   No murmur heard.  Pulmonary/Chest: Effort normal and breath sounds normal. No respiratory distress. She has no wheezes. She has no rales. She exhibits no tenderness.   Abdominal: Soft. Bowel sounds are normal. She exhibits no distension.   Musculoskeletal:         General: No edema.   Neurological: She is alert and oriented to person, place, and time.   Skin: Skin is warm and dry.   Psychiatric: Affect and judgment normal.       Lab Data Review:  Lab Results   Component Value Date/Time    CHOLSTRLTOT 186 05/17/2012 09:35 AM     05/17/2012 09:35 AM    HDL 76 05/17/2012 09:35 AM    TRIGLYCERIDE 45 05/17/2012 09:35 AM       Lab Results   Component Value Date/Time    SODIUM 134 (L) 01/02/2017 05:20 PM    POTASSIUM 3.7 01/02/2017 05:20 PM    CHLORIDE 102 01/02/2017 05:20 PM    CO2 23 01/02/2017 05:20 PM    GLUCOSE 89 01/02/2017 05:20 PM    BUN 12 01/02/2017 05:20 PM    CREATININE 0.69 01/02/2017 05:20 PM     CrCl cannot be calculated (Patient's most recent lab result is older than the maximum 7 days allowed.).  Lab Results   Component Value Date/Time    ALKPHOSPHAT 36 01/02/2017 05:20 PM    ASTSGOT 15 01/02/2017 05:20 PM    ALTSGPT 14 01/02/2017 05:20 PM    TBILIRUBIN 0.3 01/02/2017 05:20 PM      Lab Results   Component Value Date/Time    WBC 10.0 01/02/2017 05:20 PM     No results found for: HBA1C  No components found for: TROP      Cardiac Imaging and Procedures Review:      TTE 5/29/2015  CONCLUSIONS  Good.   Normal left ventricular chamber size. Normal left ventricular wall   thickness. Normal left ventricular systolic function. Left ventricular   ejection fraction is 65% to 70%. Normal diastolic function - normal   mitral inflow pattern and E/E' is normal.  Normal left atrial size. Intact atrial septum without Doppler evidence   of atrial shunting.   Right atrium not well visualized. Normal inferior vena cava size and   inspiratory  "collapse.   Trivial to small pericardial effusion without evidence of hemodynamic   compromise.  No significant valve disease or flow abnormalities.   Compared to the images of the prior study done -  there has been no   significant change.     Treadmills ECG stress 5/2015  IMPRESSION:  1.  EKG negative exercise stress test for ischemia at 7 METs.  2.  Fair exercise capacity.  [heart rate range  bpm]    Medical Decision Making:  Problem List Items Addressed This Visit     Akbar-Danlos disease    Relevant Orders    REFERRAL TO RHEUMATOLOGY    Palpitations    Relevant Orders    EKG (Completed)    Cardiac Event Monitor    EC-ECHOCARDIOGRAM COMPLETE W/O CONT    Preoperative examination        Preoperative examination for low risk endoscopy procedure. METS >4. RCRI 0. Bradycardia although clearly with chronotropic competence. Easily managed by anesthesia. No further testing would modify perioperative risk prior to low risk procedure.    Palpitations, with \"tachy-frank.\" this is a diagnosis generally reserved for atrial fibrillation which she does not have. Has undergone cardiac testing before which was normal and clearly documented chronotropic competence on treadmill ECG. Check TTE and event monitor. Beyond this, recommended cfgAdvancea device or apple watch for future rhythm monitoring.     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 Adds hyperflexible but not as dramatic as other cases I've seen. She keeps meticulous medical records which are highly, highly detailed down to minute specifics. She brings scores of material on associated conditions and manually typed lists of genetic markers she has which correlate to these diseases. Discussed issue of penetrance related to genetic testing.  She may not have all these conditions. Refer to rheumatology to further guide connective tissue disease management.     It was my pleasure to meet with Ms. Hoang.                        "

## 2020-10-22 ENCOUNTER — PATIENT MESSAGE (OUTPATIENT)
Dept: CARDIOLOGY | Facility: MEDICAL CENTER | Age: 45
End: 2020-10-22

## 2020-11-09 ENCOUNTER — NON-PROVIDER VISIT (OUTPATIENT)
Dept: CARDIOLOGY | Facility: MEDICAL CENTER | Age: 45
End: 2020-11-09
Payer: COMMERCIAL

## 2020-11-09 DIAGNOSIS — R00.2 PALPITATIONS: ICD-10-CM

## 2020-11-09 DIAGNOSIS — R07.89 OTHER CHEST PAIN: ICD-10-CM

## 2020-11-12 DIAGNOSIS — R00.2 PALPITATIONS: ICD-10-CM

## 2020-11-12 NOTE — PROGRESS NOTES
Holter Order  Received: Today  Message Contents   GUILLERMINA Kidd,     Would you mind putting an order in for this patient ([id number])?   Its a 48 hour for palps by [facility].      Order placed

## 2020-11-13 LAB — EKG IMPRESSION: NORMAL

## 2020-11-13 PROCEDURE — 93225 XTRNL ECG REC<48 HRS REC: CPT | Performed by: INTERNAL MEDICINE

## 2020-11-20 ENCOUNTER — APPOINTMENT (OUTPATIENT)
Dept: CARDIOLOGY | Facility: MEDICAL CENTER | Age: 45
End: 2020-11-20
Attending: INTERNAL MEDICINE
Payer: COMMERCIAL

## 2022-05-02 ENCOUNTER — APPOINTMENT (OUTPATIENT)
Dept: RADIOLOGY | Facility: MEDICAL CENTER | Age: 47
End: 2022-05-02
Attending: PHYSICAL MEDICINE & REHABILITATION
Payer: COMMERCIAL

## 2022-05-02 DIAGNOSIS — E24.0 PITUITARY DEPENDENT HYPERCORTISOLISM (HCC): ICD-10-CM

## 2022-05-02 PROCEDURE — 70553 MRI BRAIN STEM W/O & W/DYE: CPT | Mod: MH

## 2022-05-02 PROCEDURE — A9576 INJ PROHANCE MULTIPACK: HCPCS | Performed by: FAMILY MEDICINE

## 2022-05-02 PROCEDURE — 700117 HCHG RX CONTRAST REV CODE 255: Performed by: FAMILY MEDICINE

## 2022-05-02 RX ADMIN — GADOTERIDOL 15 ML: 279.3 INJECTION, SOLUTION INTRAVENOUS at 11:08

## 2022-07-14 ENCOUNTER — HOSPITAL ENCOUNTER (OUTPATIENT)
Dept: RADIOLOGY | Facility: MEDICAL CENTER | Age: 47
End: 2022-07-14
Attending: PHYSICAL MEDICINE & REHABILITATION
Payer: COMMERCIAL

## 2022-07-14 DIAGNOSIS — E24.9 CUSHING'S SYNDROME (HCC): ICD-10-CM

## 2022-07-14 DIAGNOSIS — C7A.1 MALIGNANT POORLY DIFFERENTIATED NEUROENDOCRINE CARCINOMA (HCC): ICD-10-CM

## 2022-07-14 PROCEDURE — 78815 PET IMAGE W/CT SKULL-THIGH: CPT | Mod: MH

## 2022-08-23 ENCOUNTER — HOSPITAL ENCOUNTER (OUTPATIENT)
Dept: RADIOLOGY | Facility: MEDICAL CENTER | Age: 47
End: 2022-08-23
Attending: INTERNAL MEDICINE
Payer: COMMERCIAL

## 2022-08-23 DIAGNOSIS — I15.2 ADRENAL HYPERTENSION (HCC): ICD-10-CM

## 2022-08-23 DIAGNOSIS — E27.9 ADRENAL HYPERTENSION (HCC): ICD-10-CM

## 2022-08-23 PROCEDURE — 74170 CT ABD WO CNTRST FLWD CNTRST: CPT | Mod: MH

## 2022-08-23 PROCEDURE — 700117 HCHG RX CONTRAST REV CODE 255

## 2022-08-23 RX ADMIN — IOHEXOL 100 ML: 350 INJECTION, SOLUTION INTRAVENOUS at 09:56

## 2022-08-24 ENCOUNTER — PATIENT MESSAGE (OUTPATIENT)
Dept: CARDIOLOGY | Facility: MEDICAL CENTER | Age: 47
End: 2022-08-24
Payer: COMMERCIAL

## 2022-08-29 ENCOUNTER — PATIENT MESSAGE (OUTPATIENT)
Dept: CARDIOLOGY | Facility: MEDICAL CENTER | Age: 47
End: 2022-08-29
Payer: COMMERCIAL

## 2022-08-29 DIAGNOSIS — Q79.60 EHLERS-DANLOS DISEASE: ICD-10-CM

## 2022-08-29 DIAGNOSIS — R00.2 PALPITATIONS: ICD-10-CM

## 2022-09-14 ENCOUNTER — TELEPHONE (OUTPATIENT)
Dept: CARDIOLOGY | Facility: MEDICAL CENTER | Age: 47
End: 2022-09-14
Payer: COMMERCIAL

## 2022-09-14 NOTE — TELEPHONE ENCOUNTER
Called Sanjay ext [phone]  Helped provide info in pt chart regarding echo approval from insurance. Sanjay verbalized understanding and will forward necessary documents to insurance.

## 2022-09-14 NOTE — TELEPHONE ENCOUNTER
----- Message from Sanjay Boateng sent at 9/14/2022 11:36 AM PDT -----  Regarding: Additional Notes Request  Good Morning,  I'm reaching out regarding patient above. Auth was requested and a notification was received from them needing more information. It is scanned into media. They are requesting the most recent offices notes within 3 to 6 months. Are there any additional notes that I can provide to the insurnace? They are also requesting any Cardiac imaging, which I will send the CTA report from 10/9/20.      If you need to speak with me, my ext is: [phone]     Thank you,   Sanjay Boateng  [facility] Imaging Authorization

## 2022-09-15 ENCOUNTER — HOSPITAL ENCOUNTER (OUTPATIENT)
Dept: CARDIOLOGY | Facility: MEDICAL CENTER | Age: 47
End: 2022-09-15
Attending: INTERNAL MEDICINE
Payer: COMMERCIAL

## 2022-09-15 DIAGNOSIS — Q79.60 EHLERS-DANLOS DISEASE: ICD-10-CM

## 2022-09-15 DIAGNOSIS — R00.2 PALPITATIONS: ICD-10-CM

## 2022-09-15 LAB
LV EJECT FRACT MOD 2C 99903: 66.37
LV EJECT FRACT MOD 4C 99902: 77.12
LV EJECT FRACT MOD BP 99901: 72.22

## 2022-09-15 PROCEDURE — 93306 TTE W/DOPPLER COMPLETE: CPT | Mod: 26 | Performed by: INTERNAL MEDICINE

## 2022-09-15 PROCEDURE — 93306 TTE W/DOPPLER COMPLETE: CPT

## 2022-09-23 ENCOUNTER — TELEPHONE (OUTPATIENT)
Dept: CARDIOLOGY | Facility: MEDICAL CENTER | Age: 47
End: 2022-09-23
Payer: COMMERCIAL

## 2022-09-23 NOTE — TELEPHONE ENCOUNTER
Ammon Roldan M.D.  You 43 minutes ago (10:12 AM)     Yes. No further testing would modify perioperative risk for intermediate risk procedure. Thank you!    -------------------------------------------------------    Form completed and faxed back to Urology at [phone]    Form sent to scan.

## 2022-09-23 NOTE — TELEPHONE ENCOUNTER
Received surgery clearance request from [facility] for pt to proceed with laparoscopic adrenalectomy.    Hx: Last seen 11/2020, normal echo 9/2022, Akbar-Danlos disease    To Dr. Verde

## 2022-10-04 ENCOUNTER — HOSPITAL ENCOUNTER (OUTPATIENT)
Facility: MEDICAL CENTER | Age: 47
End: 2022-10-04
Attending: STUDENT IN AN ORGANIZED HEALTH CARE EDUCATION/TRAINING PROGRAM
Payer: COMMERCIAL

## 2022-10-04 ENCOUNTER — OFFICE VISIT (OUTPATIENT)
Dept: MEDICAL GROUP | Facility: CLINIC | Age: 47
End: 2022-10-04
Payer: COMMERCIAL

## 2022-10-04 VITALS
WEIGHT: 163.1 LBS | OXYGEN SATURATION: 95 % | DIASTOLIC BLOOD PRESSURE: 88 MMHG | HEIGHT: 68 IN | BODY MASS INDEX: 24.72 KG/M2 | HEART RATE: 103 BPM | SYSTOLIC BLOOD PRESSURE: 129 MMHG

## 2022-10-04 DIAGNOSIS — Z12.4 SCREENING FOR CERVICAL CANCER: ICD-10-CM

## 2022-10-04 DIAGNOSIS — E27.8 MASS OF LEFT ADRENAL GLAND (HCC): ICD-10-CM

## 2022-10-04 DIAGNOSIS — L81.9 HYPERPIGMENTATION: ICD-10-CM

## 2022-10-04 PROCEDURE — 99213 OFFICE O/P EST LOW 20 MIN: CPT | Mod: GC | Performed by: STUDENT IN AN ORGANIZED HEALTH CARE EDUCATION/TRAINING PROGRAM

## 2022-10-04 PROCEDURE — 87624 HPV HI-RISK TYP POOLED RSLT: CPT

## 2022-10-04 PROCEDURE — 88175 CYTOPATH C/V AUTO FLUID REDO: CPT

## 2022-10-04 RX ORDER — DESMOPRESSIN ACETATE 0.1 MG/1
0.05 TABLET ORAL DAILY
COMMUNITY
Start: 2022-10-03

## 2022-10-04 ASSESSMENT — PATIENT HEALTH QUESTIONNAIRE - PHQ9: CLINICAL INTERPRETATION OF PHQ2 SCORE: 0

## 2022-10-04 NOTE — ASSESSMENT & PLAN NOTE
Pap Smear completed, with no abnormalities on pelvic exam. Will send for cytology and HPV. Will notify patient with results. Next due for Pap Smear in 5 years.

## 2022-10-04 NOTE — ASSESSMENT & PLAN NOTE
Unclear etiology of hyperpigmented areas with some dryness on arms and legs. Will do punch biopsy of RLE as well as of right lateral breast lesion (light brown macule that has grown rapidly in the last year). Procedure scheduled for 2 weeks from now.

## 2022-10-04 NOTE — PROGRESS NOTES
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  Follows with endocrine in [place] - Dr. Polanco.  Got PET Scan, which indicated uptake in that area, suggesting cancerous source.   Surgery planned for 11/16 - for removal and pathology.       Hyperpigmentation    On legs and arms, burning and itching. Better with coconut oil. Rings around the area. Started in 2016.    Spot on breast in 2012 - right breast. Grown considerably, but no change in pigment. Lateral to right nipple.         Screening for Cervical Cancer    Patient here for Pap Smear. Last Pap Smear was several years ago, never had an abnormal Pap Smear.  Only sexually active with one partner.         Current Outpatient Medications Ordered in Epic   Medication Sig Dispense Refill    desmopressin (DDAVP) 0.1 MG Tab Take 0.05 mg by mouth every day.      Potassium Gluconate 2.5 MEQ Tab Take 5 Tablets by mouth.      Magnesium 200 MG Tab Take  by mouth.      Aloe Liquid Take  by mouth.      D-RIBOSE PO Take  by mouth.      Multiple Vitamins-Minerals (ZINC PO) Take  by mouth. With Elderberry      L-Theanine 100 MG Cap Take  by mouth.      Doxylamine Succinate, Sleep, (SLEEP AID PO) Take  by mouth.      VALERIAN ROOT PO Take  by mouth.      Hyaluronic Acid-Vitamin C (HYALURONIC ACID PO) Take  by mouth.      B Complex Vitamins (VITAMIN B COMPLEX) TABS Take 1 Tab by mouth every day.      Cholecalciferol (VITAMIN D-3) 1000 UNITS CAPS Take 1,000 Units by mouth every day.      Omega-3 Fatty Acids (FISH OIL) 1000 MG CAPS capsule Take 1,000 mg by mouth every day.      Potassium Gluconate 595 MG TABS Take 5 Tabs by mouth 2 Times a Day.       No current [facility]-ordered facility-administered medications on file.         Objective:     Exam:  /88 (BP Location: Right arm, Patient Position: Sitting, BP Cuff Size: Adult)   Pulse (!) 103   Ht 1.727 m (5' 8\")  "  Wt 74 kg (163 lb 1.6 oz)   SpO2 95%   BMI 24.80 kg/m²  Body mass index is 24.8 kg/m².    General: Normal appearing. No distress.  Neck: Supple without JVD or bruit. Thyroid is not enlarged.  Pulmonary: Clear to ausculation.  Normal effort. No rales, ronchi, or wheezing.  Cardiovascular: Regular rate and rhythm without murmur.   Lymph: No cervical or supraclavicular lymph nodes are palpable  Skin: Warm and dry.  Hyperpigmented macules on arms and legs with some white halos and dryness. 3cm light brown macule lateral to right nipple, monochromic.   Musculoskeletal: Normal gait. No extremity cyanosis, clubbing, or edema.  Psych: Normal mood and affect. Alert and oriented x3. Judgment and insight is normal.  : Normal external genitalia, normal vaginal walls, normal appearing cervix.      Assessment & Plan:     46 y.o. female with the following -     Problem List Items Addressed This Visit       Mass of left adrenal gland (HCC)     Follows with endocrine, associated with hypercortisolism.  Curious what pathology will show. Surgery planned for 11/16.         Hyperpigmentation     Unclear etiology of hyperpigmented areas with some dryness on arms and legs. Will do punch biopsy of RLE as well as of right lateral breast lesion (light brown macule that has grown rapidly in the last year). Procedure scheduled for 2 weeks from now.         Screening for cervical cancer     Pap Smear completed, with no abnormalities on pelvic exam. Will send for cytology and HPV. Will notify patient with results. Next due for Pap Smear in 5 years.         Relevant Orders    Pap +  HPV + Reflex Genotyping 16/18/45         No follow-ups on file.    Concetta Bejarano MD   PGY-3

## 2022-10-04 NOTE — ASSESSMENT & PLAN NOTE
Follows with endocrine, associated with hypercortisolism.  Curious what pathology will show. Surgery planned for 11/16.

## 2022-10-07 LAB
CYTOLOGY REG CYTOL: NORMAL
HPV HR 12 DNA CVX QL NAA+PROBE: NEGATIVE
HPV16 DNA SPEC QL NAA+PROBE: NEGATIVE
HPV18 DNA SPEC QL NAA+PROBE: NEGATIVE
SPECIMEN SOURCE: NORMAL

## 2022-10-17 ENCOUNTER — HOSPITAL ENCOUNTER (OUTPATIENT)
Dept: LAB | Facility: MEDICAL CENTER | Age: 47
End: 2022-10-17
Attending: STUDENT IN AN ORGANIZED HEALTH CARE EDUCATION/TRAINING PROGRAM
Payer: COMMERCIAL

## 2022-10-17 LAB
ANION GAP SERPL CALC-SCNC: 11 MMOL/L (ref 7–16)
APTT PPP: 28.3 SEC (ref 24.7–36)
BASOPHILS # BLD AUTO: 1 % (ref 0–1.8)
BASOPHILS # BLD: 0.07 K/UL (ref 0–0.12)
BUN SERPL-MCNC: 9 MG/DL (ref 8–22)
CALCIUM SERPL-MCNC: 9.5 MG/DL (ref 8.5–10.5)
CHLORIDE SERPL-SCNC: 102 MMOL/L (ref 96–112)
CO2 SERPL-SCNC: 25 MMOL/L (ref 20–33)
CREAT SERPL-MCNC: 0.8 MG/DL (ref 0.5–1.4)
EOSINOPHIL # BLD AUTO: 0.06 K/UL (ref 0–0.51)
EOSINOPHIL NFR BLD: 0.9 % (ref 0–6.9)
ERYTHROCYTE [DISTWIDTH] IN BLOOD BY AUTOMATED COUNT: 45.9 FL (ref 35.9–50)
GFR SERPLBLD CREATININE-BSD FMLA CKD-EPI: 91 ML/MIN/1.73 M 2
GLUCOSE SERPL-MCNC: 92 MG/DL (ref 65–99)
HCT VFR BLD AUTO: 40.1 % (ref 37–47)
HGB BLD-MCNC: 13.4 G/DL (ref 12–16)
IMM GRANULOCYTES # BLD AUTO: 0.02 K/UL (ref 0–0.11)
IMM GRANULOCYTES NFR BLD AUTO: 0.3 % (ref 0–0.9)
INR PPP: 1.04 (ref 0.87–1.13)
LYMPHOCYTES # BLD AUTO: 2.47 K/UL (ref 1–4.8)
LYMPHOCYTES NFR BLD: 35.9 % (ref 22–41)
MCH RBC QN AUTO: 32.8 PG (ref 27–33)
MCHC RBC AUTO-ENTMCNC: 33.4 G/DL (ref 33.6–35)
MCV RBC AUTO: 98 FL (ref 81.4–97.8)
MONOCYTES # BLD AUTO: 0.61 K/UL (ref 0–0.85)
MONOCYTES NFR BLD AUTO: 8.9 % (ref 0–13.4)
NEUTROPHILS # BLD AUTO: 3.65 K/UL (ref 2–7.15)
NEUTROPHILS NFR BLD: 53 % (ref 44–72)
NRBC # BLD AUTO: 0 K/UL
NRBC BLD-RTO: 0 /100 WBC
PLATELET # BLD AUTO: 330 K/UL (ref 164–446)
PMV BLD AUTO: 9.3 FL (ref 9–12.9)
POTASSIUM SERPL-SCNC: 3.9 MMOL/L (ref 3.6–5.5)
PROTHROMBIN TIME: 13.5 SEC (ref 12–14.6)
RBC # BLD AUTO: 4.09 M/UL (ref 4.2–5.4)
SODIUM SERPL-SCNC: 138 MMOL/L (ref 135–145)
WBC # BLD AUTO: 6.9 K/UL (ref 4.8–10.8)

## 2022-10-17 PROCEDURE — 87086 URINE CULTURE/COLONY COUNT: CPT

## 2022-10-17 PROCEDURE — 85025 COMPLETE CBC W/AUTO DIFF WBC: CPT

## 2022-10-17 PROCEDURE — 36415 COLL VENOUS BLD VENIPUNCTURE: CPT

## 2022-10-17 PROCEDURE — 85610 PROTHROMBIN TIME: CPT

## 2022-10-17 PROCEDURE — 87077 CULTURE AEROBIC IDENTIFY: CPT

## 2022-10-17 PROCEDURE — 85730 THROMBOPLASTIN TIME PARTIAL: CPT

## 2022-10-17 PROCEDURE — 80048 BASIC METABOLIC PNL TOTAL CA: CPT

## 2022-10-19 LAB
BACTERIA UR CULT: ABNORMAL
BACTERIA UR CULT: ABNORMAL
SIGNIFICANT IND 70042: ABNORMAL
SITE SITE: ABNORMAL
SOURCE SOURCE: ABNORMAL

## 2022-10-21 ENCOUNTER — HOSPITAL ENCOUNTER (OUTPATIENT)
Facility: MEDICAL CENTER | Age: 47
End: 2022-10-21
Attending: STUDENT IN AN ORGANIZED HEALTH CARE EDUCATION/TRAINING PROGRAM
Payer: COMMERCIAL

## 2022-10-21 ENCOUNTER — OFFICE VISIT (OUTPATIENT)
Dept: MEDICAL GROUP | Facility: CLINIC | Age: 47
End: 2022-10-21
Payer: COMMERCIAL

## 2022-10-21 VITALS
HEART RATE: 96 BPM | OXYGEN SATURATION: 94 % | WEIGHT: 160 LBS | SYSTOLIC BLOOD PRESSURE: 149 MMHG | BODY MASS INDEX: 25.71 KG/M2 | HEIGHT: 66 IN | RESPIRATION RATE: 16 BRPM | DIASTOLIC BLOOD PRESSURE: 81 MMHG

## 2022-10-21 DIAGNOSIS — L81.9 HYPERPIGMENTATION: ICD-10-CM

## 2022-10-21 LAB
PATHOLOGY CONSULT NOTE: NORMAL
PATHOLOGY CONSULT NOTE: NORMAL

## 2022-10-21 PROCEDURE — 11104 PUNCH BX SKIN SINGLE LESION: CPT | Mod: GC | Performed by: STUDENT IN AN ORGANIZED HEALTH CARE EDUCATION/TRAINING PROGRAM

## 2022-10-21 PROCEDURE — 88305 TISSUE EXAM BY PATHOLOGIST: CPT | Mod: 59

## 2022-10-21 NOTE — PROGRESS NOTES
PRE-OP DIAGNOSIS: Hyperpigmentation of discrete lesion on right breast, lateral to nipple. Hyperpigmentation with pruritis on right posterior lower extremity.  POST-OP DIAGNOSIS: Same   PROCEDURE: skin excision with punch biopsy x2  Performing Physician: Dr. Bejarano  Supervising Physician (if applicable): Dr. Cabrales   PROCEDURE: X Punch (Size 4mm on right breast and 3mm on right lower extremity).     The area surrounding the skin lesions were prepared and draped in the  usual sterile manner. The lesion was removed in the usual manner by the  biopsy method noted above. Hemostasis was assured with 1 suture at each site.    The patient tolerated  the procedure well.     Followup: The patient tolerated the procedure well without  complications.  Standard post-procedure care is explained and return  precautions are given.  Patient will return in 6 days for suture removal.    Rheumatology referral was made at the request of patient's endocrinologist to evaluate for underlying cause of hyperpigmentation.    Concetta Bejarano MD  Family Medicine Resident, PGY-3

## 2022-10-27 ENCOUNTER — OFFICE VISIT (OUTPATIENT)
Dept: MEDICAL GROUP | Facility: CLINIC | Age: 47
End: 2022-10-27
Payer: COMMERCIAL

## 2022-10-27 VITALS
SYSTOLIC BLOOD PRESSURE: 124 MMHG | BODY MASS INDEX: 24.55 KG/M2 | RESPIRATION RATE: 16 BRPM | WEIGHT: 162 LBS | TEMPERATURE: 98.2 F | HEIGHT: 68 IN | DIASTOLIC BLOOD PRESSURE: 78 MMHG | HEART RATE: 82 BPM

## 2022-10-27 DIAGNOSIS — L81.9 HYPERPIGMENTATION: ICD-10-CM

## 2022-10-27 DIAGNOSIS — L82.1 SEBORRHEIC KERATOSIS: ICD-10-CM

## 2022-10-27 PROCEDURE — 99213 OFFICE O/P EST LOW 20 MIN: CPT | Mod: GE | Performed by: STUDENT IN AN ORGANIZED HEALTH CARE EDUCATION/TRAINING PROGRAM

## 2022-10-27 NOTE — PROGRESS NOTES
"Subjective:     CC: stitch removal and discuss results    HPI:   Jacy presents today with following up on punch biopsy. Needs stiches removed and wants to discuss results. No new concerns.     Problem   Seborrheic Keratosis       Current Outpatient Medications Ordered in Epic   Medication Sig Dispense Refill    desmopressin (DDAVP) 0.1 MG Tab Take 0.05 mg by mouth every day.      Magnesium 200 MG Tab Take  by mouth.      Aloe Liquid Take  by mouth.      D-RIBOSE PO Take  by mouth.      Multiple Vitamins-Minerals (ZINC PO) Take  by mouth. With Elderberry      L-Theanine 100 MG Cap Take  by mouth.      Doxylamine Succinate, Sleep, (SLEEP AID PO) Take  by mouth.      VALERIAN ROOT PO Take  by mouth.      Hyaluronic Acid-Vitamin C (HYALURONIC ACID PO) Take  by mouth.      B Complex Vitamins (VITAMIN B COMPLEX) TABS Take 1 Tab by mouth every day.      Cholecalciferol (VITAMIN D-3) 1000 UNITS CAPS Take 1,000 Units by mouth every day.      Omega-3 Fatty Acids (FISH OIL) 1000 MG CAPS capsule Take 1,000 mg by mouth every day.      Potassium Gluconate 595 MG TABS Take 5 Tabs by mouth 2 Times a Day.      Potassium Gluconate 2.5 MEQ Tab Take 5 Tablets by mouth. (Patient not taking: Reported on 10/27/2022)       No current [facility]-ordered facility-administered medications on file.     ROS:  Gen: no fevers/chills  Pulm: no sob  CV: no chest pain  Skin: no new or worsening rash      Objective:     Exam:  /78 (BP Location: Left arm, Patient Position: Sitting, BP Cuff Size: Adult)   Pulse 82   Temp 36.8 °C (98.2 °F) (Temporal)   Resp 16   Ht 1.727 m (5' 8\")   Wt 73.5 kg (162 lb)   BMI 24.63 kg/m²  Body mass index is 24.63 kg/m².    Gen: Alert and oriented, No apparent distress.  Neck: Full range of motion  Lungs: Normal effort  Skin: suture present on side of right breast that is overlying area of erythema, one suture present on posterior of right leg above heel that is overlying area with mild " hyperpigmentation    Assessment & Plan:     46 y.o. female with the following -     Problem List Items Addressed This Visit       Hyperpigmentation    Seborrheic keratosis     Two sutures removed. Patient tolerated well. Attending Dr. Cuellar present for entire procedure.     Discussed pathology findings. Punch biopsy from right breast demonstrated seborrheic keratosis. Discussed benign nature of this lesion and discussed possible options for removal. Patient not inclined remove at this time given that the lesion is benign. If it grows in size she may desire removal in the future.     Lesion on back of right ankle as discussed in pathology report. Findings included differential of allergic contact dermatitis, early photoallergic dermatitis, pityriasis alba, and others. Discussed benign nature of these. She has tried topical steroids with minimal relief. She is treating with OTC lotions and Aquaphor at this time and is comfortable continuing with this.     Follow-up as needed for these lesions.

## 2022-11-21 ENCOUNTER — HOSPITAL ENCOUNTER (OUTPATIENT)
Dept: LAB | Facility: MEDICAL CENTER | Age: 47
End: 2022-11-21
Attending: INTERNAL MEDICINE
Payer: COMMERCIAL

## 2022-11-21 LAB
ALBUMIN SERPL BCP-MCNC: 4.4 G/DL (ref 3.2–4.9)
BUN SERPL-MCNC: 12 MG/DL (ref 8–22)
CALCIUM SERPL-MCNC: 9.8 MG/DL (ref 8.5–10.5)
CHLORIDE SERPL-SCNC: 96 MMOL/L (ref 96–112)
CO2 SERPL-SCNC: 24 MMOL/L (ref 20–33)
CREAT SERPL-MCNC: 0.59 MG/DL (ref 0.5–1.4)
EST. AVERAGE GLUCOSE BLD GHB EST-MCNC: 97 MG/DL
GFR SERPLBLD CREATININE-BSD FMLA CKD-EPI: 111 ML/MIN/1.73 M 2
GLUCOSE SERPL-MCNC: 86 MG/DL (ref 65–99)
HBA1C MFR BLD: 5 % (ref 4–5.6)
OSMOLALITY SERPL: 281 MOSM/KG H2O (ref 278–298)
OSMOLALITY UR: 404 MOSM/KG H2O (ref 300–900)
PHOSPHATE SERPL-MCNC: 2.6 MG/DL (ref 2.5–4.5)
POTASSIUM SERPL-SCNC: 4.2 MMOL/L (ref 3.6–5.5)
SODIUM SERPL-SCNC: 131 MMOL/L (ref 135–145)

## 2022-11-21 PROCEDURE — 83036 HEMOGLOBIN GLYCOSYLATED A1C: CPT

## 2022-11-21 PROCEDURE — 80069 RENAL FUNCTION PANEL: CPT

## 2022-11-21 PROCEDURE — 36415 COLL VENOUS BLD VENIPUNCTURE: CPT

## 2022-11-21 PROCEDURE — 83930 ASSAY OF BLOOD OSMOLALITY: CPT

## 2022-11-21 PROCEDURE — 83935 ASSAY OF URINE OSMOLALITY: CPT

## 2022-12-20 ENCOUNTER — HOSPITAL ENCOUNTER (OUTPATIENT)
Dept: LAB | Facility: MEDICAL CENTER | Age: 47
End: 2022-12-20
Attending: INTERNAL MEDICINE
Payer: COMMERCIAL

## 2022-12-20 LAB
ALBUMIN SERPL BCP-MCNC: 4.4 G/DL (ref 3.2–4.9)
BUN SERPL-MCNC: 11 MG/DL (ref 8–22)
CALCIUM ALBUM COR SERPL-MCNC: 9.3 MG/DL (ref 8.5–10.5)
CALCIUM SERPL-MCNC: 9.6 MG/DL (ref 8.5–10.5)
CHLORIDE SERPL-SCNC: 100 MMOL/L (ref 96–112)
CO2 SERPL-SCNC: 25 MMOL/L (ref 20–33)
CORTIS SERPL-MCNC: 18.6 UG/DL (ref 0–23)
CREAT SERPL-MCNC: 0.73 MG/DL (ref 0.5–1.4)
GFR SERPLBLD CREATININE-BSD FMLA CKD-EPI: 101 ML/MIN/1.73 M 2
GLUCOSE SERPL-MCNC: 88 MG/DL (ref 65–99)
OSMOLALITY SERPL: 276 MOSM/KG H2O (ref 278–298)
OSMOLALITY UR: 290 MOSM/KG H2O (ref 300–900)
PHOSPHATE SERPL-MCNC: 3.3 MG/DL (ref 2.5–4.5)
POTASSIUM SERPL-SCNC: 3.9 MMOL/L (ref 3.6–5.5)
SODIUM SERPL-SCNC: 135 MMOL/L (ref 135–145)

## 2022-12-20 PROCEDURE — 82533 TOTAL CORTISOL: CPT

## 2022-12-20 PROCEDURE — 83930 ASSAY OF BLOOD OSMOLALITY: CPT

## 2022-12-20 PROCEDURE — 80069 RENAL FUNCTION PANEL: CPT

## 2022-12-20 PROCEDURE — 84300 ASSAY OF URINE SODIUM: CPT

## 2022-12-20 PROCEDURE — 82024 ASSAY OF ACTH: CPT

## 2022-12-20 PROCEDURE — 36415 COLL VENOUS BLD VENIPUNCTURE: CPT

## 2022-12-20 PROCEDURE — 82530 CORTISOL FREE: CPT

## 2022-12-20 PROCEDURE — 83935 ASSAY OF URINE OSMOLALITY: CPT

## 2022-12-22 LAB — ACTH PLAS-MCNC: 48 PG/ML (ref 7.2–63.3)

## 2022-12-27 LAB
ANNOTATION COMMENT IMP: ABNORMAL
CORTIS F 24H UR HPLC-MCNC: 13.8 UG/L
CORTIS F 24H UR-MRATE: 53.1 UG/D
CORTIS F/CREAT 24H UR: 32.86 UG/G CRT

## 2022-12-28 LAB
COLLECT DURATION TIME SPEC: 24 HRS
CREAT 24H UR-MCNC: 42 MG/DL
CREAT 24H UR-MRATE: 1617 MG/D (ref 700–1600)
SODIUM 24H UR-SCNC: 36 MMOL/L
SODIUM 24H UR-SRATE: 139 MMOL/D (ref 51–286)